# Patient Record
(demographics unavailable — no encounter records)

---

## 2017-03-04 NOTE — DIAGNOSTIC IMAGING REPORT
SHERLYN CLEVELAND~

 [facility]

 [address] [facility] [address] [place] [address]. [phone]

~

Report Submission Date: Mar 4, 2017 7:57:02 PM CST







Patient ~ Study  

 

Name: LAUREN SEWELL ~ Date: Mar 4, 2017 7:42:26 PM CST

 

MRN: [id number] ~ Modality Type: CR

 

Gender: F ~ Description: UPPER EXTREMITY

 

: 6/5/15 ~ Institution: [facility]

 

Physician: SHERLYN CLEVELAND

  ~ ~ ~





~

Right elbow -three views 



CLINICAL HISTORY: ~ 

Injury. ~Patient now refuses to use the elbow. 



FINDINGS: ~ 

Examination of the right elbow in AP, lateral and oblique views fails to 
demonstrate evidence of fracture or dislocation. ~There is no evident joint 
effusion. 



IMPRESSION: ~ 

Negative study.

~

Electronically signed on Mar 4, 2017 7:57:02 PM CST by:

Ilya LEIGH

## 2017-03-04 NOTE — ED PHYSICIAN DOCUMENTATION
Upper Extremity Injury





- HISTORIAN


Historian: parent (mom), other (GF)





- HPI


Stated Complaint: right elbow pain


Chief Complaint: Upper Extremity Injury


Additional Information: 





GF had patient's right hand. She pulled away from him rather than go indoor. A 

moment later, she sat on the floor crying. Hasn't moved right arm since. 


Onset: hours (one)


Where: home





- ROS


CONST: no problems


CVS/RESP: none


NEURO: none





- PAST HX


Past History: none


Immunizations: UTD


Allergies/Adverse Reactions: 


 Allergies











Allergy/AdvReac Type Severity Reaction Status Date / Time


 


No Known Allergies Allergy   Verified 03/04/17 19:33














Home Medications: 


 Ambulatory Orders











 Medication  Instructions  Recorded


 


NK [NK]  03/04/17














- SOCIAL HX


Smoking History: secondhand





- FAMILY HX


Family History: other (F's and M's family with heart disease in 40's)





- VITAL SIGNS


Vital Signs: 


 Vital Signs











Temp Pulse Resp BP Pulse Ox


 


    91   18 L     98 


 


    03/04/17 19:25  03/04/17 19:25     03/04/17 19:25














- REVIEWED ASSESSMENTS


Nursing Assessment  Reviewed: Yes


Vitals Reviewed: Yes





Progress





- Progress


Progress: 





1945, returned fro mx-ray moving right elbow and arm freely








Right elbow -three views 





CLINICAL HISTORY:   


Injury.  Patient now refuses to use the elbow. 





FINDINGS:   


Examination of the right elbow in AP, lateral and oblique views fails to 

demonstrate evidence of fracture or dislocation.  There is no evident joint 

effusion. 





IMPRESSION:   


Negative study.





 








Electronically signed on Mar 4, 2017 7:57:02 PM CST by:





Ilya Cancino





ED Results Lab/Radiology





- Orders


Orders: 


 ED Orders











 Category Date Time Status


 


 ELBOW 3 VIEWS [RAD] Stat Exams  03/04/17 Ordered














Upper Extremity Injury Physic





- Physical Exam


General Appearance: no acute distress, alert


Hand: normal inspection, non-tender, no evidence of injury


Wrist: normal inspection, non-tender, no evidence of injury


Elbow/Forearm: normal inspection (but will not pronate or supinate 2/2 pain)


Shoulder: normal inspection, no evidence of injury


Neuro/Vascular/Tendon: no vascular compromise, motor nml, sensation nml.  No: 

abnml color


Skin: warm,dry


Head/ENT: nml inspection


Neck/Back: nml inspection


Resp/CVS: chest non-tender, breath sounds nml, heart sounds nml, no resp. 

distress


Abdomen: non-tender





Discharge


Clincal Impression: 


Nursemaid's elbow of right upper extremity


Qualifiers:


 Encounter type: initial encounter Qualified Code(s): S53.031A - Nursemaid's 

elbow, right elbow, initial encounter





Additional Instructions: 


Follow up with your provider as needed. 


Home Medications: 


Ambulatory Orders





NK [NK]  03/04/17 








Condition: Good


Disposition: 01 HOME, SELF-CARE


Decision to Admit: NO


Decision Time: 19:59